# Patient Record
Sex: FEMALE | Race: WHITE | NOT HISPANIC OR LATINO | URBAN - NONMETROPOLITAN AREA
[De-identification: names, ages, dates, MRNs, and addresses within clinical notes are randomized per-mention and may not be internally consistent; named-entity substitution may affect disease eponyms.]

---

## 2019-10-18 ENCOUNTER — NON-PROVIDER VISIT (OUTPATIENT)
Dept: MEDICAL GROUP | Facility: CLINIC | Age: 54
End: 2019-10-18
Payer: COMMERCIAL

## 2019-10-18 ENCOUNTER — TELEMEDICINE ORIGINATING SITE VISIT (OUTPATIENT)
Dept: MEDICAL GROUP | Facility: CLINIC | Age: 54
End: 2019-10-18
Payer: COMMERCIAL

## 2019-10-18 ENCOUNTER — TELEMEDICINE2 (OUTPATIENT)
Dept: MEDICAL GROUP | Age: 54
End: 2019-10-18
Payer: COMMERCIAL

## 2019-10-18 VITALS
TEMPERATURE: 99.1 F | BODY MASS INDEX: 23.78 KG/M2 | SYSTOLIC BLOOD PRESSURE: 138 MMHG | DIASTOLIC BLOOD PRESSURE: 88 MMHG | OXYGEN SATURATION: 87 % | HEART RATE: 91 BPM | RESPIRATION RATE: 20 BRPM | WEIGHT: 148 LBS | HEIGHT: 66 IN

## 2019-10-18 DIAGNOSIS — R06.03 RESPIRATORY DISTRESS: ICD-10-CM

## 2019-10-18 DIAGNOSIS — J06.9 UPPER RESPIRATORY TRACT INFECTION, UNSPECIFIED TYPE: ICD-10-CM

## 2019-10-18 DIAGNOSIS — R06.02 SOB (SHORTNESS OF BREATH): ICD-10-CM

## 2019-10-18 PROBLEM — C43.59 MALIGNANT MELANOMA OF TORSO EXCLUDING BREAST (HCC): Status: ACTIVE | Noted: 2019-10-18

## 2019-10-18 PROBLEM — G47.09 OTHER INSOMNIA: Status: ACTIVE | Noted: 2019-10-18

## 2019-10-18 LAB
FLUAV+FLUBV AG SPEC QL IA: NEGATIVE
INT CON NEG: NEGATIVE
INT CON POS: POSITIVE

## 2019-10-18 PROCEDURE — 93000 ELECTROCARDIOGRAM COMPLETE: CPT | Performed by: INTERNAL MEDICINE

## 2019-10-18 PROCEDURE — 87804 INFLUENZA ASSAY W/OPTIC: CPT | Performed by: INTERNAL MEDICINE

## 2019-10-18 PROCEDURE — 99204 OFFICE O/P NEW MOD 45 MIN: CPT | Performed by: INTERNAL MEDICINE

## 2019-10-18 RX ORDER — ZOLPIDEM TARTRATE 12.5 MG/1
TABLET, FILM COATED, EXTENDED RELEASE ORAL
Refills: 0 | COMMUNITY
Start: 2019-08-30

## 2019-10-18 SDOH — HEALTH STABILITY: MENTAL HEALTH: HOW OFTEN DO YOU HAVE A DRINK CONTAINING ALCOHOL?: 2-3 TIMES A WEEK

## 2019-10-18 ASSESSMENT — ENCOUNTER SYMPTOMS
WHEEZING: 1
HEMOPTYSIS: 0
MYALGIAS: 1
ORTHOPNEA: 0
SORE THROAT: 1
COUGH: 1
CHILLS: 1
PALPITATIONS: 0
FEVER: 1
GASTROINTESTINAL NEGATIVE: 1
PND: 0
EYES NEGATIVE: 1
SINUS PAIN: 1
SHORTNESS OF BREATH: 1
SPUTUM PRODUCTION: 1
HEADACHES: 1

## 2019-10-18 NOTE — NON-PROVIDER
CHEYENNEuzma Goyal is a 54 y.o. female here for a non-provider visit for Flu / EKG    If abnormal was an in office provider notified today (if so, indicate provider)? Yes  Routed to PCP? Yes

## 2019-10-18 NOTE — PROGRESS NOTES
"Subjective:      Aggie Goyal is a 54 y.o. female who presents with Shortness of Breath and Cough            HPI 54-year-old female with a PMH of insomnia and melanoma is here today with an acute medical issue and to establish medical care.  Patient and her  recently moved to [place] from [place].    1.  Respiratory distress  Patient presents to the clinic with an acute onset of fevers and chills, sinus congestion tenderness, shortness of breath with chest tightness spread muscle aching, fatigue , headache  And sore sore throat.  Mild productive sputum.  Patient denies heart palpitations, radiating chest pain, dizziness, lower extremity edema, nausea or vomiting.  Patient has not taken anything over-the-counter and symptoms are worsening.    Review of Systems   Constitutional: Positive for chills, fever and malaise/fatigue.   HENT: Positive for congestion, sinus pain and sore throat.    Eyes: Negative.    Respiratory: Positive for cough, sputum production, shortness of breath and wheezing. Negative for hemoptysis.    Cardiovascular: Positive for chest pain. Negative for palpitations, orthopnea, leg swelling and PND.   Gastrointestinal: Negative.    Genitourinary: Negative.    Musculoskeletal: Positive for myalgias.   Neurological: Positive for headaches.          Objective:     /88 (BP Location: Right arm, Patient Position: Sitting)   Pulse 91   Temp 37.3 °C (99.1 °F)   Resp 20   Ht 1.676 m (5' 6\")   Wt 67.1 kg (148 lb)   SpO2 (!) 87%   BMI 23.89 kg/m²      Physical Exam   Constitutional: She is oriented to person, place, and time. She appears well-developed and well-nourished.   Moderate distress   HENT:   Head: Normocephalic and atraumatic.   Right Ear: External ear normal.   Left Ear: External ear normal.   Mouth/Throat: No oropharyngeal exudate.   Oropharynx with erythema, no exudate.  Moist mucous membranes   Eyes: Pupils are equal, round, and reactive to light. Conjunctivae and EOM are " normal. No scleral icterus.   Neck: Normal range of motion. Neck supple. No thyromegaly present.   Cardiovascular: Regular rhythm, S1 normal, S2 normal and normal heart sounds. Exam reveals no gallop and no friction rub.   No murmur heard.  Tachycardic, HR 95   Pulmonary/Chest: She is in respiratory distress. She has wheezes. She has rales.   Decreased air excursion throughout all  lung fields.   Abdominal: Soft. Bowel sounds are normal. She exhibits no distension. There is no tenderness. There is no rebound and no guarding.   Musculoskeletal: Normal range of motion. She exhibits no edema or tenderness.   Lymphadenopathy:     She has no cervical adenopathy.   Neurological: She is alert and oriented to person, place, and time. She has normal strength and normal reflexes. No cranial nerve deficit. Gait normal.   Skin: Skin is warm and dry. No rash noted. No erythema. There is pallor.   Nursing note and vitals reviewed.              Assessment/Plan:     1. Respiratory distress  Rapid flu  Negative  EKG normal sinus rhythm.  No ST segment elevations or depressions.  No arrhythmias noted.  Limited resources at the clinic at this time.  Patient advised to follow-up with the nearest emergency room for further evaluation and treatment  Patient declined transportation via ambulance  Patient's  will drive to the emergency room in [place]  Oxygen tank provided for the patient's journey  Patient leaves in stable condition from the clinic.

## 2019-10-23 ENCOUNTER — TELEMEDICINE2 (OUTPATIENT)
Dept: MEDICAL GROUP | Age: 54
End: 2019-10-23
Payer: COMMERCIAL

## 2019-10-23 ENCOUNTER — TELEMEDICINE ORIGINATING SITE VISIT (OUTPATIENT)
Dept: MEDICAL GROUP | Facility: CLINIC | Age: 54
End: 2019-10-23
Payer: COMMERCIAL

## 2019-10-23 VITALS
BODY MASS INDEX: 23.78 KG/M2 | DIASTOLIC BLOOD PRESSURE: 73 MMHG | TEMPERATURE: 98.6 F | SYSTOLIC BLOOD PRESSURE: 127 MMHG | RESPIRATION RATE: 16 BRPM | OXYGEN SATURATION: 96 % | WEIGHT: 148 LBS | HEART RATE: 76 BPM | HEIGHT: 66 IN

## 2019-10-23 DIAGNOSIS — R06.03 ACUTE RESPIRATORY DISTRESS: ICD-10-CM

## 2019-10-23 DIAGNOSIS — G93.31 POST VIRAL SYNDROME: ICD-10-CM

## 2019-10-23 PROCEDURE — 99213 OFFICE O/P EST LOW 20 MIN: CPT | Performed by: INTERNAL MEDICINE

## 2019-10-23 NOTE — PROGRESS NOTES
"CC: Follow-up ER visit    Verified identification with patient. Secured video conference with RN presenter in [facility]      HPI:   Aggie presents today with the following.  PMH insomnia, melanoma    1. Post viral syndrome  Patient is here for ER follow-up visit.  Last week, patient was seen with respiratory distress.  Patient was driven to the [place] emergency room by her  supplied with oxygen.  Patient was diagnosed with viral syndrome, given prednisone 20 mg p.o. twice daily x5 days, provided a breathing treatment and an albuterol inhaler.  Chest x-ray negative per patient.  Patient presents to the clinic today, completed her course of prednisone and feels significantly better.  Patient still admits to fatigue, muscle aches but denies chest pain, shortness of breath, headache, dizziness, fevers or chills.  Almost at her baseline.  Patient did not require albuterol for the last 2 to 3 days.    Disposition.  Patient will be temporarily living in [place] for the next year.  PCP and renal.  Patient will be due for her mammogram and lab work in February.  Patient's prescription medication includes Ambien.      Patient Active Problem List    Diagnosis Date Noted   • Other insomnia 10/18/2019   • Malignant melanoma of torso excluding breast (HCC) 10/18/2019       Current Outpatient Medications   Medication Sig Dispense Refill   • zolpidem (AMBIEN CR) 12.5 MG CR tablet TAKE 1 TABLET BY MOUTH AT BEDTIME 90 DAY [id number]  0     No current facility-administered medications for this visit.          Allergies as of 10/23/2019   • (No Known Allergies)        ROS: As per HPI.  Denies all cardiopulmonary, GI, neurologic symptoms.    /73 (BP Location: Right arm, Patient Position: Sitting)   Pulse 76   Temp 37 °C (98.6 °F)   Resp 16   Ht 1.676 m (5' 6\")   Wt 67.1 kg (148 lb)   SpO2 96%   BMI 23.89 kg/m²     Physical Exam:  Gen:         Alert and oriented, No apparent distress.  Neck:        No " Lymphadenopathy or Bruits.  Neck is supple.  No lymphadenopathy  Lungs:     Clear to auscultation bilaterally, no wheezing rhonchi or crackles  CV:          Regular rate and rhythm. No murmurs, rubs or gallops.  Abd:         Soft non tender, non distended. Normal active bowel sounds.  No  Hepatosplenomegaly, No pulsatile masses.                   Ext:          No clubbing, cyanosis, edema.      Assessment and Plan.   54 y.o. female with the following issues.    1. Post viral syndrome  Release of information for ER notes from [facility].  Stable, supportive treatment.    Patient will follow-up in February for preventative care.            Please note that this dictation was created using voice recognition software. I have made every reasonable attempt to correct obvious errors, but expect that there are errors of grammar and possible content that I did not discover before finalizing note.